# Patient Record
Sex: FEMALE | Race: WHITE | URBAN - METROPOLITAN AREA
[De-identification: names, ages, dates, MRNs, and addresses within clinical notes are randomized per-mention and may not be internally consistent; named-entity substitution may affect disease eponyms.]

---

## 2017-01-16 ENCOUNTER — OFFICE VISIT (OUTPATIENT)
Dept: FAMILY MEDICINE | Facility: CLINIC | Age: 62
End: 2017-01-16
Payer: COMMERCIAL

## 2017-01-16 VITALS
TEMPERATURE: 98.2 F | RESPIRATION RATE: 14 BRPM | BODY MASS INDEX: 27.82 KG/M2 | WEIGHT: 167 LBS | DIASTOLIC BLOOD PRESSURE: 62 MMHG | SYSTOLIC BLOOD PRESSURE: 128 MMHG | HEIGHT: 65 IN | HEART RATE: 62 BPM

## 2017-01-16 DIAGNOSIS — Z71.89 ADVANCED DIRECTIVES, COUNSELING/DISCUSSION: Primary | ICD-10-CM

## 2017-01-16 DIAGNOSIS — Z01.818 PREOP GENERAL PHYSICAL EXAM: ICD-10-CM

## 2017-01-16 PROCEDURE — 99214 OFFICE O/P EST MOD 30 MIN: CPT | Performed by: FAMILY MEDICINE

## 2017-01-16 PROCEDURE — 93000 ELECTROCARDIOGRAM COMPLETE: CPT | Performed by: FAMILY MEDICINE

## 2017-01-16 NOTE — MR AVS SNAPSHOT
After Visit Summary   1/16/2017    Saige Flores    MRN: [id number]           Patient Information     Date Of Birth          1955        Visit Information        Provider Department      1/16/2017 9:30 AM Peter Pepe MD [facility]        Today's Diagnoses     Advanced directives, counseling/discussion    -  1     Preop general physical exam           Care Instructions      Before Your Surgery      Call your surgeon if there is any change in your health. This includes signs of a cold or flu (such as a sore throat, runny nose, cough, rash or fever).    Do not smoke, drink alcohol or take over the counter medicine (unless your surgeon or primary care doctor tells you to) for the 24 hours before and after surgery.    If you take prescribed drugs: Follow your doctor s orders about which medicines to take and which to stop until after surgery.    Eating and drinking prior to surgery: follow the instructions from your surgeon    Take a shower or bath the night before surgery. Use the soap your surgeon gave you to gently clean your skin. If you do not have soap from your surgeon, use your regular soap. Do not shave or scrub the surgery site.  Wear clean pajamas and have clean sheets on your bed.         Follow-ups after your visit        Who to contact     If you have questions or need follow up information about today's clinic visit or your schedule please contact [facility] directly at [phone].  Normal or non-critical lab and imaging results will be communicated to you by MyChart, letter or phone within 4 business days after the clinic has received the results. If you do not hear from us within 7 days, please contact the clinic through MyChart or phone. If you have a critical or abnormal lab result, we will notify you by phone as soon as possible.  Submit refill requests through [employer] or call your pharmacy and they will forward the refill request to us.  "Please allow 3 business days for your refill to be completed.          Additional Information About Your Visit        MyChart Information     [employer] gives you secure access to your electronic health record. If you see a primary care provider, you can also send messages to your care team and make appointments. If you have questions, please call your primary care clinic.  If you do not have a primary care provider, please call [phone] and they will assist you.        Care EveryWhere ID     This is your Care EveryWhere ID. This could be used by other organizations to access your [place] medical records  [id number]        Your Vitals Were     Pulse Temperature Respirations Height BMI (Body Mass Index) Last Period    62 98.2  F (36.8  C) (Oral) 14 5' 5.25\" (1.657 m) 27.59 kg/m2 07/24/2005    Breastfeeding?                   No            Blood Pressure from Last 3 Encounters:   01/16/17 128/62   10/03/16 128/76   07/19/16 112/70    Weight from Last 3 Encounters:   01/16/17 167 lb (75.751 kg)   10/03/16 155 lb 12.8 oz (70.67 kg)   07/19/16 158 lb (71.668 kg)              We Performed the Following     EKG 12-lead complete w/read - Clinics        Primary Care Provider Office Phone # Fax #    Peter Pepe [phone]       [address] [address]        Thank you!     Thank you for choosing [facility]  for your care. Our goal is always to provide you with excellent care. Hearing back from our patients is one way we can continue to improve our services. Please take a few minutes to complete the written survey that you may receive in the mail after your visit with us. Thank you!             Your Updated Medication List - Protect others around you: Learn how to safely use, store and throw away your medicines at www.disposemymeds.org.          This list is accurate as of: 1/16/17 10:30 AM.  Always use your most recent med list.          "          Brand Name Dispense Instructions for use    aspirin 81 MG tablet     90 tablet    Take 1 tablet by mouth daily.

## 2017-01-16 NOTE — PROGRESS NOTES
[facility]  [address] [address]  [phone]  Dept: [phone]    PRE-OP EVALUATION:  Today's date: 2017    Saige Flores (: 1955) presents for pre-operative evaluation assessment as requested by Dr. Camarena.  She requires evaluation and anesthesia risk assessment prior to undergoing surgery/procedure for treatment of left bunion .  Proposed procedure: left bunion     Date of Surgery/ Procedure: 2017   Time of Surgery/ Procedure: not sure   Hospital/Surgical Facility: [facility]  [phone]  Primary Physician: Peter Pepe  Type of Anesthesia Anticipated: General    Patient has a Health Care Directive or Living Will:  YES     Preop Questions 2017   1.  Do you have a history of heart attack, stroke, stent, bypass or surgery on an artery in the head, neck, heart or legs? No   2.  Do you ever have any pain or discomfort in your chest? No   3.  Do you have a history of  Heart Failure? No   4.   Are you troubled by shortness of breath when:  walking on a level surface, or up a slight hill, or at night? No   5.  Do you currently have a cold, bronchitis or other respiratory infection? No   6.  Do you have a cough, shortness of breath, or wheezing? No   7.  Do you sometimes get pains in the calves of your legs when you walk? No   8. Do you or anyone in your family have previous history of blood clots? No   9.  Do you or does anyone in your family have a serious bleeding problem such as prolonged bleeding following surgeries or cuts? No   10. Have you ever had problems with anemia or been told to take iron pills? No   11. Have you had any abnormal blood loss such as black, tarry or bloody stools, or abnormal vaginal bleeding? No   12. Have you ever had a blood transfusion? No   13. Have you or any of your relatives ever had problems with anesthesia? No   14. Do you have sleep apnea, excessive snoring or daytime drowsiness?  No   15. Do you have any prosthetic heart valves? No   16. Do you have prosthetic joints? No   17. Is there any chance that you may be pregnant? No       HPI:                                                      Brief HPI related to upcoming procedure: left bunion surgery    MEDICAL HISTORY:                                                      Patient Active Problem List    Diagnosis Date Noted     CARDIOVASCULAR SCREENING; LDL GOAL LESS THAN 160 10/06/2016     The 10-year ASCVD risk score (Lucía SMYTH Jr., et al., 2013) is: 3.2%    Values used to calculate the score:      Age: 61 years      Sex: Female      Is an : No      Diabetic: No      Tobacco smoker: No      Systolic Blood Pressure: 128 mmHg      Prescribed Antihypertensives: No      HDL Cholesterol: 89 mg/dL      Total Cholesterol: 255 mg/dL         Family history of ischemic heart disease 06/27/2011      Past Medical History   Diagnosis Date     Rosacea      metrogel ineffective     Acute maxillary sinusitis 7/26/2005     Family history of ischemic heart disease 6/27/2011     HTN (hypertension) 6/14/2010     Hyperlipidemia LDL goal <130 6/17/2014     Past Surgical History   Procedure Laterality Date     No history of surgery       Current Outpatient Prescriptions   Medication Sig Dispense Refill     aspirin 81 MG tablet Take 1 tablet by mouth daily. 90 tablet 3     OTC products: None, except as noted above    Allergies   Allergen Reactions     No Known Drug Allergies       Latex Allergy: NO    Social History   Substance Use Topics     Smoking status: Never Smoker      Smokeless tobacco: Never Used     Alcohol Use: Yes      Comment: social     History   Drug Use No       REVIEW OF SYSTEMS:                                                    Denies tooth pain, difficulty swallowing, heart burn, chest pain, SOB, palpations, diarrhea, constipation, bruising, bleeding, dysuria, urinary frequency, or coughing.      This document serves as a record of  the services and decisions personally performed and made by My Green MD. It was created on his behalf by Julius Wright a trained medical scribe. The creation of this document is based the provider's statements to the medical scribe. Julius Wright January 16, 2017 9:51 AM  EXAM:                                                    LMP 07/24/2005  GEN: Healthy, Alert, No distress  EYES: pupils are symmetric  ENT: ears without cerumen, mucus membranes moist  NECK: no thyroidmegaly  CHEST: Clear to auscultation, respirations unlabored  HEART: S1S2 RRR no murmur nor apical displacement  ABD: soft without mass or organomegaly   MS: per surgery   DIAGNOSTICS:                                                      Recent Labs   Lab Test  10/03/16   0844  06/28/12   0812   HGB  14.8  14.2   PLT  260  243   NA  139  134   POTASSIUM  4.0  4.1   CR  0.58  0.56     IMPRESSION:                                                    Reason for surgery/procedure: treatment of left bunion  Diagnosis/reason for consult: assess perioperative risk    The proposed surgical procedure is considered LOW risk.  EKG Sinus carloz, unchanged  REVISED CARDIAC RISK INDEX  The patient has the following serious cardiovascular risks for perioperative complications such as (MI, PE, VFib and 3  AV Block):  No serious cardiac risks  INTERPRETATION: 0 risks: Class I (very low risk - 0.4% complication rate)    The patient has the following additional risks for perioperative complications:  No identified additional risks      ICD-10-CM    1. Advanced directives, counseling/discussion Z71.89        RECOMMENDATIONS:                                                      --Consult hospital rounder / IM to assist post-op medical management    DISCONTINUE Aspirin TWO WEEKS before surgery     APPROVAL GIVEN to proceed with proposed procedure, without further diagnostic evaluation    The information in this document, created by a scribe for me, accurately reflects  the services I personally performed and the decisions made by me. I have reviewed and approved this document for accuracy. 9:54 AM January 16, 2017     Signed Electronically by: Peter Pepe MD    Copy of this evaluation report is provided to requesting physician.    [facility] Preop Guidelines

## 2017-01-16 NOTE — NURSING NOTE
"Chief Complaint   Patient presents with     Pre-Op Exam     left Bunion 1/30/2017       Initial LMP 07/24/2005 Estimated body mass index is 25.74 kg/(m^2) as calculated from the following:    Height as of 10/3/16: 5' 5.25\" (1.657 m).    Weight as of 10/3/16: 155 lb 12.8 oz (70.67 kg).  BP completed using cuff size: regular left arm Blessing Bell MA      "

## 2017-01-30 ENCOUNTER — TRANSFERRED RECORDS (OUTPATIENT)
Dept: HEALTH INFORMATION MANAGEMENT | Facility: CLINIC | Age: 62
End: 2017-01-30

## 2017-09-08 ENCOUNTER — TELEPHONE (OUTPATIENT)
Dept: FAMILY MEDICINE | Facility: CLINIC | Age: 62
End: 2017-09-08

## 2017-09-08 NOTE — TELEPHONE ENCOUNTER
Panel Management Review      Patient has the following on her problem list: None      Composite cancer screening  Chart review shows that this patient is due/due soon for the following Mammogram  Summary:    Patient is due/failing the following:   MAMMOGRAM    Action needed:   Patient needs referral/order: Mammo    Type of outreach:    Phone, left message for patient to call back.     Questions for provider review:    None                                                                                                                                    Blessing Bell MA       Chart routed to Care Team [id number] .

## 2017-09-08 NOTE — LETTER
[facility]  [address] [id number] [phone]  September 12, 2017    Saige Flores  [address] [place] [address]    Dear Saige,    I care about your health and have reviewed your health plan. I have reviewed your medical conditions, medication list, and lab results and am making recommendations based on this review, to better manage your health.    You are in particular need of attention regarding:  -Breast Cancer Screening    I am recommending that you:  {recommendations:-schedule a MAMMOGRAM which is due. We have mammogram available at our clinic; Tuesday 8:00am-11:30am or Wednesday 2:00pm-4:15pm- to schedule call [phone].   Please disregard this reminder if you have had this exam elsewhere within the last year.  It would be helpful for us to have a copy of your mammogram report in our file so that we can best coordinate your care.    Here is a list of Health Maintenance topics that are due now or due soon:  Health Maintenance Due   Topic Date Due     INFLUENZA VACCINE (SYSTEM ASSIGNED)  09/01/2017     LIPID MONITORING Q1 YEAR  10/03/2017     MAMMO SCREEN Q2 YR (SYSTEM ASSIGNED)  10/06/2017       Please call us at [phone] (or use [facility]) to address the above recommendations.     Thank you for trusting [facility] and we appreciate the opportunity to serve you.  We look forward to supporting your healthcare needs in the future.    Healthy Regards,    Peter Pepe MD

## 2017-11-13 ENCOUNTER — TELEPHONE (OUTPATIENT)
Dept: FAMILY MEDICINE | Facility: CLINIC | Age: 62
End: 2017-11-13

## 2018-01-30 ENCOUNTER — TELEPHONE (OUTPATIENT)
Dept: FAMILY MEDICINE | Facility: CLINIC | Age: 63
End: 2018-01-30

## 2018-01-30 NOTE — TELEPHONE ENCOUNTER
Panel Management Review      Patient has the following on her problem list: None      Composite cancer screening  Chart review shows that this patient is due/due soon for the following Pap Smear and Mammogram  Summary:    Patient is due/failing the following:   MAMMOGRAM and PAP    Action needed:   Patient needs office visit for PE/PAP. and Patient needs referral/order: mammo    Type of outreach:    Phone, left message for patient to call back.  and Sent [employer] message.    Questions for provider review:    None                                                                                                                                    Sade Piedra/KATTY  [place]---[facility]       Chart routed to none .

## 2021-11-18 ENCOUNTER — TRANSFERRED RECORDS (OUTPATIENT)
Dept: HEALTH INFORMATION MANAGEMENT | Facility: CLINIC | Age: 66
End: 2021-11-18
Payer: COMMERCIAL